# Patient Record
Sex: FEMALE | Race: BLACK OR AFRICAN AMERICAN | NOT HISPANIC OR LATINO | Employment: OTHER | URBAN - METROPOLITAN AREA
[De-identification: names, ages, dates, MRNs, and addresses within clinical notes are randomized per-mention and may not be internally consistent; named-entity substitution may affect disease eponyms.]

---

## 2024-09-20 ENCOUNTER — HOSPITAL ENCOUNTER (EMERGENCY)
Facility: HOSPITAL | Age: 74
Discharge: HOME | End: 2024-09-20
Attending: EMERGENCY MEDICINE
Payer: MEDICARE

## 2024-09-20 ENCOUNTER — APPOINTMENT (OUTPATIENT)
Dept: CARDIOLOGY | Facility: HOSPITAL | Age: 74
End: 2024-09-20
Payer: MEDICARE

## 2024-09-20 VITALS
OXYGEN SATURATION: 98 % | HEART RATE: 95 BPM | SYSTOLIC BLOOD PRESSURE: 164 MMHG | HEIGHT: 62 IN | RESPIRATION RATE: 15 BRPM | WEIGHT: 194 LBS | BODY MASS INDEX: 35.7 KG/M2 | TEMPERATURE: 98.6 F | DIASTOLIC BLOOD PRESSURE: 82 MMHG

## 2024-09-20 DIAGNOSIS — I10 ASYMPTOMATIC HYPERTENSION: Primary | ICD-10-CM

## 2024-09-20 LAB
ALBUMIN SERPL BCP-MCNC: 4 G/DL (ref 3.4–5)
ALP SERPL-CCNC: 70 U/L (ref 33–136)
ALT SERPL W P-5'-P-CCNC: 14 U/L (ref 7–45)
ANION GAP SERPL CALC-SCNC: 13 MMOL/L (ref 10–20)
APPEARANCE UR: CLEAR
AST SERPL W P-5'-P-CCNC: 16 U/L (ref 9–39)
BASOPHILS # BLD AUTO: 0.02 X10*3/UL (ref 0–0.1)
BASOPHILS NFR BLD AUTO: 0.3 %
BILIRUB SERPL-MCNC: 0.8 MG/DL (ref 0–1.2)
BILIRUB UR STRIP.AUTO-MCNC: NEGATIVE MG/DL
BUN SERPL-MCNC: 11 MG/DL (ref 6–23)
CALCIUM SERPL-MCNC: 9.7 MG/DL (ref 8.6–10.3)
CHLORIDE SERPL-SCNC: 102 MMOL/L (ref 98–107)
CO2 SERPL-SCNC: 29 MMOL/L (ref 21–32)
COLOR UR: COLORLESS
CREAT SERPL-MCNC: 0.9 MG/DL (ref 0.5–1.05)
EGFRCR SERPLBLD CKD-EPI 2021: 68 ML/MIN/1.73M*2
EOSINOPHIL # BLD AUTO: 0.05 X10*3/UL (ref 0–0.4)
EOSINOPHIL NFR BLD AUTO: 0.9 %
ERYTHROCYTE [DISTWIDTH] IN BLOOD BY AUTOMATED COUNT: 14.2 % (ref 11.5–14.5)
GLUCOSE SERPL-MCNC: 97 MG/DL (ref 74–99)
GLUCOSE UR STRIP.AUTO-MCNC: ABNORMAL MG/DL
HCT VFR BLD AUTO: 48.1 % (ref 36–46)
HGB BLD-MCNC: 15.8 G/DL (ref 12–16)
IMM GRANULOCYTES # BLD AUTO: 0.02 X10*3/UL (ref 0–0.5)
IMM GRANULOCYTES NFR BLD AUTO: 0.3 % (ref 0–0.9)
KETONES UR STRIP.AUTO-MCNC: NEGATIVE MG/DL
LEUKOCYTE ESTERASE UR QL STRIP.AUTO: NEGATIVE
LYMPHOCYTES # BLD AUTO: 1.02 X10*3/UL (ref 0.8–3)
LYMPHOCYTES NFR BLD AUTO: 17.6 %
MCH RBC QN AUTO: 28.9 PG (ref 26–34)
MCHC RBC AUTO-ENTMCNC: 32.8 G/DL (ref 32–36)
MCV RBC AUTO: 88 FL (ref 80–100)
MONOCYTES # BLD AUTO: 0.47 X10*3/UL (ref 0.05–0.8)
MONOCYTES NFR BLD AUTO: 8.1 %
NEUTROPHILS # BLD AUTO: 4.2 X10*3/UL (ref 1.6–5.5)
NEUTROPHILS NFR BLD AUTO: 72.8 %
NITRITE UR QL STRIP.AUTO: NEGATIVE
NRBC BLD-RTO: 0 /100 WBCS (ref 0–0)
PH UR STRIP.AUTO: 7.5 [PH]
PLATELET # BLD AUTO: 270 X10*3/UL (ref 150–450)
POTASSIUM SERPL-SCNC: 3.6 MMOL/L (ref 3.5–5.3)
PROT SERPL-MCNC: 7.4 G/DL (ref 6.4–8.2)
PROT UR STRIP.AUTO-MCNC: ABNORMAL MG/DL
RBC # BLD AUTO: 5.47 X10*6/UL (ref 4–5.2)
RBC # UR STRIP.AUTO: NEGATIVE /UL
RBC #/AREA URNS AUTO: NORMAL /HPF
SODIUM SERPL-SCNC: 140 MMOL/L (ref 136–145)
SP GR UR STRIP.AUTO: 1.01
UROBILINOGEN UR STRIP.AUTO-MCNC: NORMAL MG/DL
WBC # BLD AUTO: 5.8 X10*3/UL (ref 4.4–11.3)
WBC #/AREA URNS AUTO: NORMAL /HPF

## 2024-09-20 PROCEDURE — 93005 ELECTROCARDIOGRAM TRACING: CPT

## 2024-09-20 PROCEDURE — 99283 EMERGENCY DEPT VISIT LOW MDM: CPT

## 2024-09-20 PROCEDURE — 81003 URINALYSIS AUTO W/O SCOPE: CPT | Performed by: EMERGENCY MEDICINE

## 2024-09-20 PROCEDURE — 85025 COMPLETE CBC W/AUTO DIFF WBC: CPT

## 2024-09-20 PROCEDURE — 36415 COLL VENOUS BLD VENIPUNCTURE: CPT

## 2024-09-20 PROCEDURE — 80053 COMPREHEN METABOLIC PANEL: CPT

## 2024-09-20 ASSESSMENT — PAIN DESCRIPTION - PROGRESSION: CLINICAL_PROGRESSION: NOT CHANGED

## 2024-09-20 ASSESSMENT — COLUMBIA-SUICIDE SEVERITY RATING SCALE - C-SSRS
1. IN THE PAST MONTH, HAVE YOU WISHED YOU WERE DEAD OR WISHED YOU COULD GO TO SLEEP AND NOT WAKE UP?: NO
2. HAVE YOU ACTUALLY HAD ANY THOUGHTS OF KILLING YOURSELF?: NO
6. HAVE YOU EVER DONE ANYTHING, STARTED TO DO ANYTHING, OR PREPARED TO DO ANYTHING TO END YOUR LIFE?: NO

## 2024-09-20 ASSESSMENT — PAIN SCALES - GENERAL: PAINLEVEL_OUTOF10: 0 - NO PAIN

## 2024-09-20 ASSESSMENT — PAIN - FUNCTIONAL ASSESSMENT: PAIN_FUNCTIONAL_ASSESSMENT: 0-10

## 2024-09-20 NOTE — ED PROVIDER NOTES
History of Present Illness   History provided by: Patient  Limitations to History: None  External Records Reviewed with Brief Summary: Outpatient progress note from 4/24/2024 which showed past medical history and most recent up-to-date medication list.    HPI:  Rupa Cali is a 74 y.o. female with a past medical history of diabetes, hypertension, hyperlipidemia, GERD, RYAN, CKD stage III, and breast cancer/endometrial cancer that presents to the emergency department today for hypertension.  The patient states that she has been taking her amlodipine and losartan as instructed and has not missed any recent doses.  She took her blood pressure this morning and noted that it was elevated 200s/100s and states that she normally runs in the 120s-130s systolically with a diastolic in the 80s/90s.  Patient states that her blood pressure was normal yesterday.  She does complain of some intermittent lightheadedness that has been going on for a few days but denies any chest pain, shortness of breath, current dizziness, syncopal episodes, palpitations, abdominal pain, lower extremity pain/swelling, numbness/tingling, focal weakness, vision changes or any other associated symptoms.    Physical Exam   Triage vitals:  T 37 °C (98.6 °F)  HR 89  BP (S) (!) 214/88 (on wrist double lymph nodes)  RR 16  O2 98 %      Vital signs reviewed in nursing triage note, EMR flow sheets, and at patient's bedside.   General: Awake, alert, in no acute distress  Eyes: Gaze conjugate.  No scleral icterus or injection  HENT: Normo-cephalic, atraumatic. No stridor. No rhinorrhea or epistaxis.  CV: Regular rate and rhythm. No murmurs appreciated.  2+ radial DP pulses bilaterally.  Respiratory: Breathing non-labored, speaking in full sentences.  Lungs clear to auscultation bilaterally.  GI: Soft, non-distended, non-tender. No rebound or guarding.  MSK/Extremities: No gross bony deformities. Moving all extremities. No lower extremity edema.  Skin:  Warm. Appropriate color  Neuro: A&Ox3.  Normal speech pattern. PERRLA. EOMI. Visual fields intact bilaterally.  No facial droop.  Symmetric smile.  Equal cheek puffing.  Normal sensation to light touch in V1-3.  5/5 strength shoulder shrug.  5/5 strength in bilateral UE and LE.  Normal sensation to light touch in bilateral UE and LE.  No dysmetria w/ finger-nose-finger or heel-shin bilaterally.  No dysdiadochocinesia w/ rapid alternating movements.  No ataxic gait.   Psych: Appropriate mood and affect      Medical Decision Making & ED Course   Medical Decision Makin y.o. female with the above-stated past medical history that presents to the emergency department for hypertension.  Upon arrival to the emergency department the patient was hypertensive with initial blood pressure of 214/88 but had otherwise stable vital signs, was afebrile and saturating well on room air.  History and physical exam are as above but notable for nontoxic-appearing patient in no acute distress.  The patient had a nonfocal neurologic exam and no abnormalities on her physical exam.  Will obtain basic labs to evaluate for endorgan damage in the setting of acute hypertension but otherwise the patient is asymptomatic.  There is no indication for imaging at this time.  See ED course below for further workup and final disposition.      Differential diagnoses considered include but are not limited to: Asymptomatic hypertension, ICH, kidney injury    ED Course:  ED Course as of 24 1342   Fri Sep 20, 2024   1341 Labs reviewed and grossly unremarkable.  Blood pressure has improved with most recent reading of 164/82.  Patient continues to be asymptomatic.  The patient is appropriate for discharge at this time and was provided with strict return precautions including vision changes, focal weakness, numbness/tingling, severe/crushing chest pain, shortness of breath on exertion, syncope, severe/sudden onset headaches or strokelike symptoms.   The patient verbalized their understanding of this and was discharged in stable condition.  Instructions to follow-up with their primary care provider within 3-5 days for recheck of her blood pressure were provided. [RS]      ED Course User Index  [RS] Galo Casper DO         Diagnoses as of 09/20/24 1342   Asymptomatic hypertension        Social Determinants of Health which Significantly Impact Care: None identified     EKG Independent Interpretation:  EKG shows sinus tachycardia at a rate of 106 bpm with normal axis.  Intervals are within normal limits.  No ST elevation or T wave inversions appreciated.  No POOJA.  No prior EKG for comparison.    Independent Result Review and Interpretation: Relevant laboratory and radiographic results were reviewed and independently interpreted by myself.  As necessary, they are commented on in the ED Course.    Chronic conditions affecting the patient's care: As documented in the HPI    The patient was discussed with the following consultants/services: None    Care Considerations: As documented above in MDM    Disposition   As a result of the work-up, the patient was discharged home.  she was informed of her diagnosis and instructed to come back with any concerns or worsening of condition.  she and was agreeable to the plan as discussed above.  she was given the opportunity to ask questions.  All of the patient's questions were answered.    Procedures   Procedures    Patient seen and discussed with ED attending physician.    Galo Casper DO   Emergency Medicine, PGY-2     Disclaimer: This note was dictated by speech recognition. Minor errors in transcription may be present.     [unfilled] ? [employer] last updated 9/20/2024 10:24 AM        Galo Casper DO  Resident  09/20/24 1342

## 2024-09-20 NOTE — DISCHARGE INSTRUCTIONS
You have been diagnosed with asymptomatic hypertension in the emergency department today.  Your labs and imaging did not show any concerning findings today and you will be discharged home.  You should continue taking your high blood pressure medications as instructed.  Please follow-up with your primary care provider within the next 3-5 days in order to have your blood pressure reevaluated.  Please return to the emergency department if you begin experiencing any changes in vision, one-sided weakness, significant dizziness, severe crushing chest pain, shortness of breath on exertion, passout, severe/sudden onset headache, or strokelike symptoms.

## 2024-09-20 NOTE — ED TRIAGE NOTES
Patient presents with hypertension, history of double lymphnode removal, bp was done on wrist. Patient concerned for hypertension. Pt takes amlodipine for BP. Pt has hx of HTN and diabetes.

## 2024-09-24 LAB
ATRIAL RATE: 107 BPM
P AXIS: 42 DEGREES
P OFFSET: 178 MS
P ONSET: 133 MS
PR INTERVAL: 160 MS
Q ONSET: 213 MS
QRS COUNT: 18 BEATS
QRS DURATION: 88 MS
QT INTERVAL: 336 MS
QTC CALCULATION(BAZETT): 446 MS
QTC FREDERICIA: 406 MS
R AXIS: -10 DEGREES
T AXIS: 54 DEGREES
T OFFSET: 381 MS
VENTRICULAR RATE: 106 BPM